# Patient Record
Sex: MALE | Race: WHITE | NOT HISPANIC OR LATINO | URBAN - METROPOLITAN AREA
[De-identification: names, ages, dates, MRNs, and addresses within clinical notes are randomized per-mention and may not be internally consistent; named-entity substitution may affect disease eponyms.]

---

## 2023-03-22 LAB — PROSTATE SPECIFIC ANTIGEN,SCREEN: 2.92 NG/ML (ref 0–4)

## 2023-10-08 PROBLEM — R00.2 PALPITATIONS: Status: ACTIVE | Noted: 2023-10-08

## 2023-10-08 PROBLEM — E85.4 AMYLOID HEART DISEASE (MULTI): Status: ACTIVE | Noted: 2023-10-08

## 2023-10-08 PROBLEM — R07.9 CHEST PAIN ON EXERTION: Status: ACTIVE | Noted: 2023-10-08

## 2023-10-08 PROBLEM — R31.9 HEMATURIA: Status: ACTIVE | Noted: 2023-10-08

## 2023-10-08 PROBLEM — N39.43 BENIGN PROSTATIC HYPERPLASIA (BPH) WITH POST-VOID DRIBBLING: Status: ACTIVE | Noted: 2023-10-08

## 2023-10-08 PROBLEM — N40.1 BENIGN PROSTATIC HYPERPLASIA (BPH) WITH POST-VOID DRIBBLING: Status: ACTIVE | Noted: 2023-10-08

## 2023-10-08 PROBLEM — I51.9 CHRONIC SYSTOLIC DYSFUNCTION OF LEFT VENTRICLE: Status: ACTIVE | Noted: 2023-10-08

## 2023-10-08 PROBLEM — R31.0 GROSS HEMATURIA: Status: ACTIVE | Noted: 2023-10-08

## 2023-10-08 PROBLEM — K40.90 INGUINAL HERNIA, LEFT: Status: ACTIVE | Noted: 2023-10-08

## 2023-10-08 PROBLEM — I27.20 PULMONARY HYPERTENSION (MULTI): Status: ACTIVE | Noted: 2023-10-08

## 2023-10-08 PROBLEM — I43 AMYLOID HEART DISEASE (MULTI): Status: ACTIVE | Noted: 2023-10-08

## 2023-10-08 PROBLEM — E78.5 HYPERLIPIDEMIA: Status: ACTIVE | Noted: 2023-10-08

## 2023-10-08 PROBLEM — C61 ADENOCARCINOMA OF PROSTATE (MULTI): Status: ACTIVE | Noted: 2023-10-08

## 2023-10-08 PROBLEM — I47.20 VENTRICULAR TACHYCARDIA (PAROXYSMAL): Status: ACTIVE | Noted: 2023-10-08

## 2023-10-08 PROBLEM — I47.29 VENTRICULAR TACHYCARDIA (PAROXYSMAL) (MULTI): Status: ACTIVE | Noted: 2023-10-08

## 2023-10-08 RX ORDER — NADOLOL 20 MG/1
1 TABLET ORAL DAILY
COMMUNITY
Start: 2017-02-08 | End: 2023-10-10 | Stop reason: WASHOUT

## 2023-10-08 RX ORDER — AMLODIPINE BESYLATE 2.5 MG/1
1 TABLET ORAL DAILY
COMMUNITY
Start: 2020-10-07 | End: 2023-10-10

## 2023-10-08 RX ORDER — MELOXICAM 15 MG/1
1 TABLET ORAL DAILY
COMMUNITY
Start: 2017-06-23

## 2023-10-08 RX ORDER — TAMSULOSIN HYDROCHLORIDE 0.4 MG/1
1 CAPSULE ORAL DAILY
COMMUNITY
Start: 2021-05-06

## 2023-10-10 ENCOUNTER — LAB (OUTPATIENT)
Dept: LAB | Facility: LAB | Age: 73
End: 2023-10-10
Payer: MEDICARE

## 2023-10-10 ENCOUNTER — OFFICE VISIT (OUTPATIENT)
Dept: GASTROENTEROLOGY | Facility: CLINIC | Age: 73
End: 2023-10-10
Payer: MEDICARE

## 2023-10-10 VITALS
SYSTOLIC BLOOD PRESSURE: 155 MMHG | BODY MASS INDEX: 32.64 KG/M2 | HEIGHT: 70 IN | HEART RATE: 75 BPM | DIASTOLIC BLOOD PRESSURE: 78 MMHG | WEIGHT: 228 LBS

## 2023-10-10 DIAGNOSIS — R19.4 CHANGE IN BOWEL HABITS: Primary | ICD-10-CM

## 2023-10-10 DIAGNOSIS — R19.4 CHANGE IN BOWEL HABITS: ICD-10-CM

## 2023-10-10 LAB — CRP SERPL-MCNC: 1.66 MG/DL

## 2023-10-10 PROCEDURE — 1160F RVW MEDS BY RX/DR IN RCRD: CPT | Performed by: PHYSICIAN ASSISTANT

## 2023-10-10 PROCEDURE — 1036F TOBACCO NON-USER: CPT | Performed by: PHYSICIAN ASSISTANT

## 2023-10-10 PROCEDURE — 86140 C-REACTIVE PROTEIN: CPT

## 2023-10-10 PROCEDURE — 1159F MED LIST DOCD IN RCRD: CPT | Performed by: PHYSICIAN ASSISTANT

## 2023-10-10 PROCEDURE — 99204 OFFICE O/P NEW MOD 45 MIN: CPT | Performed by: PHYSICIAN ASSISTANT

## 2023-10-10 PROCEDURE — 36415 COLL VENOUS BLD VENIPUNCTURE: CPT

## 2023-10-10 RX ORDER — AMLODIPINE BESYLATE 10 MG/1
10 TABLET ORAL DAILY
COMMUNITY
Start: 2023-09-30

## 2023-10-10 RX ORDER — LOSARTAN POTASSIUM 50 MG/1
50 TABLET ORAL
COMMUNITY
Start: 2023-03-29 | End: 2024-03-28

## 2023-10-10 NOTE — PATIENT INSTRUCTIONS
Thank you for coming in for your appointment.    -get labs and stool study done  -Get colonoscopy done  -Try daily fiber supplement.

## 2023-10-10 NOTE — H&P (VIEW-ONLY)
Subjective   Patient ID: Chicho Hyde is a 73 y.o. male who was referred by himself for New Patient Visit (Referred by  for changes in bowel habits/Denies: Abdominal pain, constipation and rectal bleeding./LS: 03/2019/Colon: 04/04/2019).    Patient's PCP is Dr. Hinojosa    HPI  Patient was seen by our office in 2019 for screening colonoscopy. He had colonoscopy in April 2019 in which 2 small polyps were removed, one benign and one tubular adenoma. He states that over the past few months he has had a change in bowel habits to looser stool. He is having 1 loose stool daily. He states that his bowel movements are more urgent than before. He denies medication changes or lifestyle changes. Denies unexplained weight loss, dysphagia, N/V, melena, hematochezia. He states that he has been having gross hematuria and saw urology. He reports a family history of colon caner in his grandfather around age 50. Denies NSAID use. 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 Following up with ortho soon.    Prior GI evaluation:  Colonoscopy 4/2019: 2 polyps removed (1 benign, 1 tubular adenoma).    Review of Systems:  Constitutional: No reported fever, chills, or weight loss.  Skin: No reported icterus, lesions, or rash.  Eye: No reported itching, pain, vision changes.  Ear: No reported discharge, hearing loss, or pain.  Nose: No reported congestion, discharge, or epistaxis.  Mouth/throat: No reported dysphagia, hoarseness, or throat pain.  Resp: No reported cough, dyspnea, or wheezing.  Cardiovascular: No reported chest pain, lower extremity edema, or palpitations.   GI: Reports change in bowel habits to 1 loose stool daily.  : No reported dysuria, hematuria, or frequency.  Neuro: No reported confusion, memory loss, headaches, or dizziness.  Psych: No reported anxiety, depression, or insomnia.  Musculoskeletal: No reported arthralgia, joint swelling, or myalgias.  Heme/lymph: No reported easy bleeding or  bruising, or swollen lymph nodes.  Endocrine: No reported cold/heat intolerance, polydipsia, or polyuria.      Medications:  Prior to Admission medications    Medication Sig Start Date End Date Taking? Authorizing Provider   amLODIPine (Norvasc) 10 mg tablet Take 1 tablet (10 mg) by mouth once daily. 9/30/23  Yes Historical Provider, MD   losartan (Cozaar) 50 mg tablet Take 1 tablet (50 mg) by mouth once daily. 3/29/23 3/28/24 Yes Historical Provider, MD   meloxicam (Mobic) 15 mg tablet Take 1 tablet (15 mg) by mouth once daily. WITH FOOD 6/23/17  Yes Historical Provider, MD   tafamidis (Vyndamax) 61 mg capsule Take by mouth.   Yes Historical Provider, MD   tamsulosin (Flomax) 0.4 mg 24 hr capsule Take 1 capsule (0.4 mg) by mouth once daily. 5/6/21  Yes Historical Provider, MD   amLODIPine (Norvasc) 2.5 mg tablet Take 1 tablet (2.5 mg) by mouth once daily. 10/7/20 10/10/23  Historical Provider, MD   nadolol (Corgard) 20 mg tablet Take 1 tablet (20 mg) by mouth once daily. 2/8/17 10/10/23  Historical Provider, MD       Allergies:  Oxaprozin    Past Medical History:  He has a past medical history of Functional dyspepsia, Other forms of dyspnea, Personal history of other diseases of male genital organs, Personal history of other diseases of the musculoskeletal system and connective tissue, Personal history of other diseases of the nervous system and sense organs, Personal history of other diseases of the nervous system and sense organs, Personal history of other endocrine, nutritional and metabolic disease, Personal history of other specified conditions, Personal history of other specified conditions, and Personal history of other specified conditions.    Past Surgical History:  He has a past surgical history that includes Carpal tunnel release (06/30/2017); Other surgical history (08/02/2017); Umbilical hernia repair (08/02/2017); Hernia repair (08/02/2017); Other surgical history (08/14/2019); and  "Colonoscopy.    Social History:  He reports that he has never smoked. He has never used smokeless tobacco. He reports current alcohol use of about 2.0 standard drinks of alcohol per week. He reports that he does not use drugs.    Objective   Physical exam:  Constitutional: Well developed, well nourished 73 y.o. year old in no acute distress.   Skin: Warm and dry. Normal turgor. No rash, ulcer, trauma, jaundice.   Eyes: Pupils symmetric and reactive to light.  Respiratory: Clear to auscultation bilaterally. No wheezes, rhonchi, or rales heard.  Cardiovascular: Regular rate and rhythm. S1 and S2 appreciated and normal. No murmur, rub, or gallop heard.   Edema: No edema noted.  GI: Normal bowel sounds. Soft, non-distended, non-tender. No rebound or guarding present. No hepatomegaly or splenomegaly appreciated. Abdominal aorta not palpably abnormal.  Musculoskeletal: Limbs and Joints grossly normal. Full range of motion in major joint.   Neuro: Alert and oriented x 3. Cranial nerves 2-12 grossly intact.   Psych: Normal mood and affect.        Relevant Results Recent labs reviewed in the EMR.  No results found for: \"HGB\", \"MCV\", \"PLT\"    No results found for: \"FERRITIN\", \"IRON\"    No results found for: \"NA\", \"K\", \"CL\", \"BUN\", \"CREATININE\"    No results found for: \"BILITOT\"  No results found for: \"ALT\", \"AST\", \"ALKPHOS\"    No results found for: \"CRP\"    No results found for: \"CALPS\"    Radiology: Reviewed imaging reviewed in the EMR.  CT urography w 3D volume rendered imaging    Result Date: 9/15/2023  Interpreted By:  JOSE DURANT MD MRN: [id number] Patient Name: DODIE CLAYTON  STUDY: CT UROGRAPHY WITH AND WITHOUT CONTRAST;  9/15/2023 11:34 am  INDICATION: gross hematuria  R31.0: Gross hematuria.  COMPARISON: CT abdomen and pelvis without contrast 11 May 2018  ACCESSION NUMBER(S): [id number]  ORDERING CLINICIAN: JUAN CORTEZ  TECHNIQUE: CT Abdomen and pelvis from the lung bases through the symphysis pubis using CT Urogram " protocol, including CT of the abdomen and pelvis without IV contrast, followed by 2-phase CT of both the abdomen and pelvis after the uneventful administration of 75 mL Omnipaque 350 intravenous contrast, including nephrographic and excretory phases of the abdomen and pelvis.  No oral contrast.  Sagittal and coronal reformatted images of both the nephrographic and excretory phases were created and reviewed.  Three dimensional maximum intensity projection (3-D MIPs) image/s were created on a separate dedicated workstation, reviewed and saved  FINDINGS: KIDNEYS AND URINARY TRACT:  RIGHT: Stone:  Negative Hydronephrosis:  Negative Ureteral stricture:  Negative Parenchymal mass:  Negative. No solid, enhancing or otherwise suspect parenchymal mass lesion Urothelial mass:  Negative. No soft tissue attenuation filling defects in the excreted contrast-opacified collecting system or ureter. Limitations:  None; the entire ureter was well opacified with excreted contrast on the excretory phase Congenital variant anatomy:  Negative; no variant anatomy such as duplicated ureters Other:  n/a  LEFT: Stone:  Negative Hydronephrosis:  Negative Ureteral stricture:  Negative Parenchymal mass:  Negative. No solid, enhancing or otherwise suspect parenchymal mass lesion Urothelial mass:  Negative. No soft tissue attenuation filling defects in the excreted contrast-opacified collecting system or ureter. Limitations:  None; the entire ureter was well opacified with excreted contrast on the excretory phase Congenital variant anatomy:  Negative; no variant anatomy such as duplicated ureters Other:  n/a  URINARY BLADDER: Stone:  Negative Urothelial mass:  Negative. No soft tissue attenuation filling defects in the excreted contrast-opacified urinary bladder Limitations:  The nondependent one half of the urinary bladder was not well enough opacified with excreted contrast to evaluate for an underlying lesion Wall thickening:  Negative  Surrounding acute inflammatory change: Negative Diverticula:  Negative Trabeculations:  Negative Congenital variant anatomy:  Negative. No variant anatomy such as urachal remnant Fistula:  Negative Other:  n/a  ABDOMEN:  LIVER:  Normal except for two subcentimeter simple cysts both unchanged, both of highly doubtful clinical consequence. No enlargement or evidence of cirrhosis or fatty change. No mass or other suspect lesion.  SPLEEN: Normal. No enlargement, mass or evidence of splenic vein thrombosis.  PANCREAS: Normal. No CT evidence of acute or chronic pancreatitis. No duct dilation. No mass.  GALLBLADDER:  Normal CT appearance. No dilation, calcified, or gas-containing stones.  Other types of gallstones could be occult on CT and detectable only by ultrasound.  BILE DUCTS:  Normal. No biliary duct dilation.  ADRENAL GLANDS: Normal. No nodule or mass.  LYMPH NODES:  No adenopathy, intraperitoneal, retroperitoneal, pelvic or otherwise  APPENDIX:  Normal.  Not dilated, thick walled or in any other way inflamed in appearance.  No inflammatory change about the appendix.  COLON:  Normal. No sign of acute diverticulitis or other colitis. No annular constricting mass.  SMALL BOWEL: Normal. No small bowel dilation or any other sign of small bowel obstruction. No sign of active inflammatory bowel disease.  STOMACH / DUODENUM: Grossly normal by CT which has limited sensitivity and specificity for the stomach and duodenum.  RETROPERITONEUM:  Normal. No acute hemorrhage or inflammatory change. Lymph nodes in a separate dedicated section.  OMENTUM, MESENTERY AND PERITONEAL SPACES: Free intraperitoneal air:  Negative Free intraperitoneal fluid:  Negative Abscess:  Negative Other:  n/a  PELVIS:  Prostate partially obscured by streak artifact from the right hip prosthesis  VASCULATURE:  No choose one active hernia. I suspect prior left inguinal hernia repair  ABDOMINAL WALL: Hernia:  No active hernia. Hernia repair mesh ventral  pelvic wall is intact and unchanged in CT appearance Other:  No acute or contributory abnormality.  LOWER CHEST:  No acute airspace disease  BONES:  No acute findings      NO ACUTE OR CONTRIBUTORY ABNORMALITY  NO STONE ANYWHERE IN THE URINARY TRACT  NO HYDROURETERONEPHROSIS ON EITHER SIDE  NO EVIDENCE OF ACUTE INFLAMMATION ANYWHERE IN THE URINARY TRACT  NO SOLID RENAL PARENCHYMAL MASS  NO EVIDENCE OF UROTHELIAL LESION IN THE OPACIFIED URINARY TRACT, NOTING THAT THE FOLLOWING WAS / WERE NOT WELL ENOUGH OPACIFIED WITH EXCRETED CONTRAST DURING THE EXCRETORY PHASE OF TODAY'S EXAM TO EVALUATE DIRECTLY: THE NONDEPENDENT ONE HALF OF THE URINARY BLADDER. (BILATERAL UPPER TRACTS WELL OPACIFIED THROUGHOUT, CLEAR THROUGHOUT)  NO VARIANT ANATOMY SUCH AS URACHAL REMNANT OR DUPLICATED/ECTOPIC URETERS  NO ACUTE UNANTICIPATED PROCESS IN THE ABDOMEN OR PELVIS OUTSIDE THE URINARY TRACT      Assessment/Plan   Problem List Items Addressed This Visit             ICD-10-CM       Gastrointestinal and Abdominal    Change in bowel habits - Primary R19.4     Started about June 2023- change to loose stool. He states that it is occurring 3-4 days a week. He states that he has 1 loose stool a day. Used to be formed. Denies any medication changes. Will order colonoscopy to evaluate for inflammation or polyp/removed. CRP and calprotectin ordered. Recommend daily fiber         Relevant Orders    C-Reactive Protein    Calprotectin, Fecal    Colonoscopy Diagnostic         Sheron Mares PA-C

## 2023-10-10 NOTE — ASSESSMENT & PLAN NOTE
Started about June 2023- change to loose stool. He states that it is occurring 3-4 days a week. He states that he has 1 loose stool a day. Used to be formed. Denies any medication changes. Will order colonoscopy to evaluate for inflammation or polyp/removed. CRP and calprotectin ordered. Recommend daily fiber

## 2023-10-10 NOTE — PROGRESS NOTES
Subjective   Patient ID: Chicho Hyde is a 73 y.o. male who was referred by himself for New Patient Visit (Referred by  for changes in bowel habits/Denies: Abdominal pain, constipation and rectal bleeding./LS: 03/2019/Colon: 04/04/2019).    Patient's PCP is Dr. Hinojosa    HPI  Patient was seen by our office in 2019 for screening colonoscopy. He had colonoscopy in April 2019 in which 2 small polyps were removed, one benign and one tubular adenoma. He states that over the past few months he has had a change in bowel habits to looser stool. He is having 1 loose stool daily. He states that his bowel movements are more urgent than before. He denies medication changes or lifestyle changes. Denies unexplained weight loss, dysphagia, N/V, melena, hematochezia. He states that he has been having gross hematuria and saw urology. He reports a family history of colon caner in his grandfather around age 50. Denies NSAID use. 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 Following up with ortho soon.    Prior GI evaluation:  Colonoscopy 4/2019: 2 polyps removed (1 benign, 1 tubular adenoma).    Review of Systems:  Constitutional: No reported fever, chills, or weight loss.  Skin: No reported icterus, lesions, or rash.  Eye: No reported itching, pain, vision changes.  Ear: No reported discharge, hearing loss, or pain.  Nose: No reported congestion, discharge, or epistaxis.  Mouth/throat: No reported dysphagia, hoarseness, or throat pain.  Resp: No reported cough, dyspnea, or wheezing.  Cardiovascular: No reported chest pain, lower extremity edema, or palpitations.   GI: Reports change in bowel habits to 1 loose stool daily.  : No reported dysuria, hematuria, or frequency.  Neuro: No reported confusion, memory loss, headaches, or dizziness.  Psych: No reported anxiety, depression, or insomnia.  Musculoskeletal: No reported arthralgia, joint swelling, or myalgias.  Heme/lymph: No reported easy bleeding or  bruising, or swollen lymph nodes.  Endocrine: No reported cold/heat intolerance, polydipsia, or polyuria.      Medications:  Prior to Admission medications    Medication Sig Start Date End Date Taking? Authorizing Provider   amLODIPine (Norvasc) 10 mg tablet Take 1 tablet (10 mg) by mouth once daily. 9/30/23  Yes Historical Provider, MD   losartan (Cozaar) 50 mg tablet Take 1 tablet (50 mg) by mouth once daily. 3/29/23 3/28/24 Yes Historical Provider, MD   meloxicam (Mobic) 15 mg tablet Take 1 tablet (15 mg) by mouth once daily. WITH FOOD 6/23/17  Yes Historical Provider, MD   tafamidis (Vyndamax) 61 mg capsule Take by mouth.   Yes Historical Provider, MD   tamsulosin (Flomax) 0.4 mg 24 hr capsule Take 1 capsule (0.4 mg) by mouth once daily. 5/6/21  Yes Historical Provider, MD   amLODIPine (Norvasc) 2.5 mg tablet Take 1 tablet (2.5 mg) by mouth once daily. 10/7/20 10/10/23  Historical Provider, MD   nadolol (Corgard) 20 mg tablet Take 1 tablet (20 mg) by mouth once daily. 2/8/17 10/10/23  Historical Provider, MD       Allergies:  Oxaprozin    Past Medical History:  He has a past medical history of Functional dyspepsia, Other forms of dyspnea, Personal history of other diseases of male genital organs, Personal history of other diseases of the musculoskeletal system and connective tissue, Personal history of other diseases of the nervous system and sense organs, Personal history of other diseases of the nervous system and sense organs, Personal history of other endocrine, nutritional and metabolic disease, Personal history of other specified conditions, Personal history of other specified conditions, and Personal history of other specified conditions.    Past Surgical History:  He has a past surgical history that includes Carpal tunnel release (06/30/2017); Other surgical history (08/02/2017); Umbilical hernia repair (08/02/2017); Hernia repair (08/02/2017); Other surgical history (08/14/2019); and  "Colonoscopy.    Social History:  He reports that he has never smoked. He has never used smokeless tobacco. He reports current alcohol use of about 2.0 standard drinks of alcohol per week. He reports that he does not use drugs.    Objective   Physical exam:  Constitutional: Well developed, well nourished 73 y.o. year old in no acute distress.   Skin: Warm and dry. Normal turgor. No rash, ulcer, trauma, jaundice.   Eyes: Pupils symmetric and reactive to light.  Respiratory: Clear to auscultation bilaterally. No wheezes, rhonchi, or rales heard.  Cardiovascular: Regular rate and rhythm. S1 and S2 appreciated and normal. No murmur, rub, or gallop heard.   Edema: No edema noted.  GI: Normal bowel sounds. Soft, non-distended, non-tender. No rebound or guarding present. No hepatomegaly or splenomegaly appreciated. Abdominal aorta not palpably abnormal.  Musculoskeletal: Limbs and Joints grossly normal. Full range of motion in major joint.   Neuro: Alert and oriented x 3. Cranial nerves 2-12 grossly intact.   Psych: Normal mood and affect.        Relevant Results Recent labs reviewed in the EMR.  No results found for: \"HGB\", \"MCV\", \"PLT\"    No results found for: \"FERRITIN\", \"IRON\"    No results found for: \"NA\", \"K\", \"CL\", \"BUN\", \"CREATININE\"    No results found for: \"BILITOT\"  No results found for: \"ALT\", \"AST\", \"ALKPHOS\"    No results found for: \"CRP\"    No results found for: \"CALPS\"    Radiology: Reviewed imaging reviewed in the EMR.  CT urography w 3D volume rendered imaging    Result Date: 9/15/2023  Interpreted By:  JOSE DURANT MD MRN: [id number] Patient Name: DODIE CLAYTON  STUDY: CT UROGRAPHY WITH AND WITHOUT CONTRAST;  9/15/2023 11:34 am  INDICATION: gross hematuria  R31.0: Gross hematuria.  COMPARISON: CT abdomen and pelvis without contrast 11 May 2018  ACCESSION NUMBER(S): [id number]  ORDERING CLINICIAN: JUAN CORTEZ  TECHNIQUE: CT Abdomen and pelvis from the lung bases through the symphysis pubis using CT Urogram " protocol, including CT of the abdomen and pelvis without IV contrast, followed by 2-phase CT of both the abdomen and pelvis after the uneventful administration of 75 mL Omnipaque 350 intravenous contrast, including nephrographic and excretory phases of the abdomen and pelvis.  No oral contrast.  Sagittal and coronal reformatted images of both the nephrographic and excretory phases were created and reviewed.  Three dimensional maximum intensity projection (3-D MIPs) image/s were created on a separate dedicated workstation, reviewed and saved  FINDINGS: KIDNEYS AND URINARY TRACT:  RIGHT: Stone:  Negative Hydronephrosis:  Negative Ureteral stricture:  Negative Parenchymal mass:  Negative. No solid, enhancing or otherwise suspect parenchymal mass lesion Urothelial mass:  Negative. No soft tissue attenuation filling defects in the excreted contrast-opacified collecting system or ureter. Limitations:  None; the entire ureter was well opacified with excreted contrast on the excretory phase Congenital variant anatomy:  Negative; no variant anatomy such as duplicated ureters Other:  n/a  LEFT: Stone:  Negative Hydronephrosis:  Negative Ureteral stricture:  Negative Parenchymal mass:  Negative. No solid, enhancing or otherwise suspect parenchymal mass lesion Urothelial mass:  Negative. No soft tissue attenuation filling defects in the excreted contrast-opacified collecting system or ureter. Limitations:  None; the entire ureter was well opacified with excreted contrast on the excretory phase Congenital variant anatomy:  Negative; no variant anatomy such as duplicated ureters Other:  n/a  URINARY BLADDER: Stone:  Negative Urothelial mass:  Negative. No soft tissue attenuation filling defects in the excreted contrast-opacified urinary bladder Limitations:  The nondependent one half of the urinary bladder was not well enough opacified with excreted contrast to evaluate for an underlying lesion Wall thickening:  Negative  Surrounding acute inflammatory change: Negative Diverticula:  Negative Trabeculations:  Negative Congenital variant anatomy:  Negative. No variant anatomy such as urachal remnant Fistula:  Negative Other:  n/a  ABDOMEN:  LIVER:  Normal except for two subcentimeter simple cysts both unchanged, both of highly doubtful clinical consequence. No enlargement or evidence of cirrhosis or fatty change. No mass or other suspect lesion.  SPLEEN: Normal. No enlargement, mass or evidence of splenic vein thrombosis.  PANCREAS: Normal. No CT evidence of acute or chronic pancreatitis. No duct dilation. No mass.  GALLBLADDER:  Normal CT appearance. No dilation, calcified, or gas-containing stones.  Other types of gallstones could be occult on CT and detectable only by ultrasound.  BILE DUCTS:  Normal. No biliary duct dilation.  ADRENAL GLANDS: Normal. No nodule or mass.  LYMPH NODES:  No adenopathy, intraperitoneal, retroperitoneal, pelvic or otherwise  APPENDIX:  Normal.  Not dilated, thick walled or in any other way inflamed in appearance.  No inflammatory change about the appendix.  COLON:  Normal. No sign of acute diverticulitis or other colitis. No annular constricting mass.  SMALL BOWEL: Normal. No small bowel dilation or any other sign of small bowel obstruction. No sign of active inflammatory bowel disease.  STOMACH / DUODENUM: Grossly normal by CT which has limited sensitivity and specificity for the stomach and duodenum.  RETROPERITONEUM:  Normal. No acute hemorrhage or inflammatory change. Lymph nodes in a separate dedicated section.  OMENTUM, MESENTERY AND PERITONEAL SPACES: Free intraperitoneal air:  Negative Free intraperitoneal fluid:  Negative Abscess:  Negative Other:  n/a  PELVIS:  Prostate partially obscured by streak artifact from the right hip prosthesis  VASCULATURE:  No choose one active hernia. I suspect prior left inguinal hernia repair  ABDOMINAL WALL: Hernia:  No active hernia. Hernia repair mesh ventral  pelvic wall is intact and unchanged in CT appearance Other:  No acute or contributory abnormality.  LOWER CHEST:  No acute airspace disease  BONES:  No acute findings      NO ACUTE OR CONTRIBUTORY ABNORMALITY  NO STONE ANYWHERE IN THE URINARY TRACT  NO HYDROURETERONEPHROSIS ON EITHER SIDE  NO EVIDENCE OF ACUTE INFLAMMATION ANYWHERE IN THE URINARY TRACT  NO SOLID RENAL PARENCHYMAL MASS  NO EVIDENCE OF UROTHELIAL LESION IN THE OPACIFIED URINARY TRACT, NOTING THAT THE FOLLOWING WAS / WERE NOT WELL ENOUGH OPACIFIED WITH EXCRETED CONTRAST DURING THE EXCRETORY PHASE OF TODAY'S EXAM TO EVALUATE DIRECTLY: THE NONDEPENDENT ONE HALF OF THE URINARY BLADDER. (BILATERAL UPPER TRACTS WELL OPACIFIED THROUGHOUT, CLEAR THROUGHOUT)  NO VARIANT ANATOMY SUCH AS URACHAL REMNANT OR DUPLICATED/ECTOPIC URETERS  NO ACUTE UNANTICIPATED PROCESS IN THE ABDOMEN OR PELVIS OUTSIDE THE URINARY TRACT      Assessment/Plan   Problem List Items Addressed This Visit             ICD-10-CM       Gastrointestinal and Abdominal    Change in bowel habits - Primary R19.4     Started about June 2023- change to loose stool. He states that it is occurring 3-4 days a week. He states that he has 1 loose stool a day. Used to be formed. Denies any medication changes. Will order colonoscopy to evaluate for inflammation or polyp/removed. CRP and calprotectin ordered. Recommend daily fiber         Relevant Orders    C-Reactive Protein    Calprotectin, Fecal    Colonoscopy Diagnostic         Sheron Mares PA-C

## 2023-10-13 ENCOUNTER — TELEPHONE (OUTPATIENT)
Dept: GASTROENTEROLOGY | Facility: CLINIC | Age: 73
End: 2023-10-13

## 2023-10-13 ENCOUNTER — LAB (OUTPATIENT)
Dept: LAB | Facility: LAB | Age: 73
End: 2023-10-13
Payer: MEDICARE

## 2023-10-13 DIAGNOSIS — R19.4 CHANGE IN BOWEL HABITS: ICD-10-CM

## 2023-10-13 PROCEDURE — 83993 ASSAY FOR CALPROTECTIN FECAL: CPT

## 2023-10-17 LAB — CALPROTECTIN STL-MCNT: 19 UG/G

## 2023-10-18 ENCOUNTER — ANESTHESIA (OUTPATIENT)
Dept: GASTROENTEROLOGY | Facility: HOSPITAL | Age: 73
End: 2023-10-18
Payer: MEDICARE

## 2023-10-18 ENCOUNTER — HOSPITAL ENCOUNTER (OUTPATIENT)
Dept: GASTROENTEROLOGY | Facility: HOSPITAL | Age: 73
Discharge: HOME | End: 2023-10-18
Payer: MEDICARE

## 2023-10-18 ENCOUNTER — ANESTHESIA EVENT (OUTPATIENT)
Dept: GASTROENTEROLOGY | Facility: HOSPITAL | Age: 73
End: 2023-10-18
Payer: MEDICARE

## 2023-10-18 VITALS
DIASTOLIC BLOOD PRESSURE: 78 MMHG | TEMPERATURE: 97.1 F | OXYGEN SATURATION: 95 % | HEART RATE: 74 BPM | WEIGHT: 225 LBS | BODY MASS INDEX: 32.21 KG/M2 | RESPIRATION RATE: 16 BRPM | SYSTOLIC BLOOD PRESSURE: 130 MMHG | HEIGHT: 70 IN

## 2023-10-18 DIAGNOSIS — R19.4 CHANGE IN BOWEL HABITS: ICD-10-CM

## 2023-10-18 PROBLEM — H91.93 BILATERAL HEARING LOSS: Status: ACTIVE | Noted: 2023-10-18

## 2023-10-18 PROCEDURE — 88305 TISSUE EXAM BY PATHOLOGIST: CPT | Performed by: PATHOLOGY

## 2023-10-18 PROCEDURE — 88305 TISSUE EXAM BY PATHOLOGIST: CPT | Mod: TC,SUR,PORLAB | Performed by: INTERNAL MEDICINE

## 2023-10-18 PROCEDURE — 2500000004 HC RX 250 GENERAL PHARMACY W/ HCPCS (ALT 636 FOR OP/ED): Performed by: INTERNAL MEDICINE

## 2023-10-18 PROCEDURE — 3700000002 HC GENERAL ANESTHESIA TIME - EACH INCREMENTAL 1 MINUTE

## 2023-10-18 PROCEDURE — 7100000010 HC PHASE TWO TIME - EACH INCREMENTAL 1 MINUTE

## 2023-10-18 PROCEDURE — 2500000005 HC RX 250 GENERAL PHARMACY W/O HCPCS: Performed by: NURSE ANESTHETIST, CERTIFIED REGISTERED

## 2023-10-18 PROCEDURE — 3700000001 HC GENERAL ANESTHESIA TIME - INITIAL BASE CHARGE

## 2023-10-18 PROCEDURE — 45380 COLONOSCOPY AND BIOPSY: CPT | Performed by: INTERNAL MEDICINE

## 2023-10-18 PROCEDURE — 7100000009 HC PHASE TWO TIME - INITIAL BASE CHARGE

## 2023-10-18 PROCEDURE — 2500000004 HC RX 250 GENERAL PHARMACY W/ HCPCS (ALT 636 FOR OP/ED): Performed by: NURSE ANESTHETIST, CERTIFIED REGISTERED

## 2023-10-18 PROCEDURE — 88305 TISSUE EXAM BY PATHOLOGIST: CPT | Mod: TC,PORLAB | Performed by: INTERNAL MEDICINE

## 2023-10-18 PROCEDURE — A4649 SURGICAL SUPPLIES: HCPCS

## 2023-10-18 RX ORDER — SODIUM CHLORIDE 9 MG/ML
30 INJECTION, SOLUTION INTRAVENOUS CONTINUOUS
Status: DISCONTINUED | OUTPATIENT
Start: 2023-10-18 | End: 2023-10-20 | Stop reason: HOSPADM

## 2023-10-18 RX ORDER — LIDOCAINE HYDROCHLORIDE 20 MG/ML
INJECTION, SOLUTION EPIDURAL; INFILTRATION; INTRACAUDAL; PERINEURAL AS NEEDED
Status: DISCONTINUED | OUTPATIENT
Start: 2023-10-18 | End: 2023-10-18

## 2023-10-18 RX ORDER — PROPOFOL 10 MG/ML
INJECTION, EMULSION INTRAVENOUS AS NEEDED
Status: DISCONTINUED | OUTPATIENT
Start: 2023-10-18 | End: 2023-10-18

## 2023-10-18 RX ADMIN — PROPOFOL 50 MG: 10 INJECTION, EMULSION INTRAVENOUS at 13:25

## 2023-10-18 RX ADMIN — PROPOFOL 20 MG: 10 INJECTION, EMULSION INTRAVENOUS at 13:34

## 2023-10-18 RX ADMIN — LIDOCAINE HYDROCHLORIDE 50 MG: 20 INJECTION, SOLUTION EPIDURAL; INFILTRATION; INTRACAUDAL; PERINEURAL at 13:25

## 2023-10-18 RX ADMIN — SODIUM CHLORIDE 30 ML/HR: 9 INJECTION, SOLUTION INTRAVENOUS at 11:15

## 2023-10-18 RX ADMIN — SODIUM CHLORIDE: 9 INJECTION, SOLUTION INTRAVENOUS at 13:15

## 2023-10-18 RX ADMIN — PROPOFOL 20 MG: 10 INJECTION, EMULSION INTRAVENOUS at 13:32

## 2023-10-18 RX ADMIN — PROPOFOL 50 MG: 10 INJECTION, EMULSION INTRAVENOUS at 13:27

## 2023-10-18 RX ADMIN — PROPOFOL 20 MG: 10 INJECTION, EMULSION INTRAVENOUS at 13:36

## 2023-10-18 RX ADMIN — PROPOFOL 40 MG: 10 INJECTION, EMULSION INTRAVENOUS at 13:29

## 2023-10-18 SDOH — HEALTH STABILITY: MENTAL HEALTH: CURRENT SMOKER: 0

## 2023-10-18 ASSESSMENT — PAIN SCALES - GENERAL
PAINLEVEL_OUTOF10: 0 - NO PAIN

## 2023-10-18 ASSESSMENT — PAIN - FUNCTIONAL ASSESSMENT
PAIN_FUNCTIONAL_ASSESSMENT: 0-10

## 2023-10-18 ASSESSMENT — COLUMBIA-SUICIDE SEVERITY RATING SCALE - C-SSRS
2. HAVE YOU ACTUALLY HAD ANY THOUGHTS OF KILLING YOURSELF?: NO
6. HAVE YOU EVER DONE ANYTHING, STARTED TO DO ANYTHING, OR PREPARED TO DO ANYTHING TO END YOUR LIFE?: NO
1. IN THE PAST MONTH, HAVE YOU WISHED YOU WERE DEAD OR WISHED YOU COULD GO TO SLEEP AND NOT WAKE UP?: NO

## 2023-10-18 NOTE — ANESTHESIA PREPROCEDURE EVALUATION
Patient: Chicho Hyde    Procedure Information       Date/Time: 10/18/23 1115    Scheduled providers: Cristhian Whitley MD    Procedure: COLONOSCOPY    Location: [facility]            Relevant Problems   Anesthesia (within normal limits)      Cardiovascular  Sees cardiologist [facility], last in 2/2023   (+) Chest pain on exertion   (+) Hyperlipidemia   (+) Pulmonary hypertension (CMS/HCC)   (+) Ventricular tachycardia (paroxysmal) (CMS/HCC)      Endocrine (within normal limits)      GI (within normal limits)      /Renal (within normal limits)      Pulmonary   (+) Pulmonary hypertension (CMS/HCC)      GI/Hepatic (within normal limits)      Hematology (within normal limits)      Eyes, Ears, Nose, and Throat   (+) Bilateral hearing loss       Clinical information reviewed:   Tobacco  Allergies  Meds   Med Hx  Surg Hx   Fam Hx  Soc Hx        NPO Detail:  NPO/Void Status  Carbonhydrate Drink Given Prior to Surgery? : N  Date of Last Liquid: 10/17/23  Time of Last Liquid: 1830  Date of Last Solid: 10/16/23  Time of Last Solid: 2000         Physical Exam    Airway  Mallampati: II  TM distance: >3 FB  Neck ROM: full     Cardiovascular   Rhythm: regular  Rate: normal     Dental - normal exam     Pulmonary - normal exam     Abdominal - normal exam             Anesthesia Plan    ASA 3     MAC     The patient is not a current smoker.  Patient was previously instructed to abstain from smoking on day of procedure.  Patient did not smoke on day of procedure.    intravenous induction   Anesthetic plan and risks discussed with patient.  Use of blood products discussed with who consented to blood products.

## 2023-10-18 NOTE — INTERVAL H&P NOTE
H&P reviewed. The patient was examined and there are no changes to the H&P.  The patient is here for evaluation of recent change in bowel habits characterized by increasingly loose stools.  There is no report of blood in stool.  Colonoscopy done in 2019 revealed 1 tubular adenoma and 1 hyperplastic polyp.  His abdominal examination is unremarkable.  Colonoscopy is planned for today.

## 2023-10-18 NOTE — ANESTHESIA POSTPROCEDURE EVALUATION
Patient: Chicho Hyde    Procedure Summary       Date: 10/18/23 Room / Location: [facility]    Anesthesia Start: 1323 Anesthesia Stop: 1350    Procedure: COLONOSCOPY Diagnosis: Change in bowel habits    Scheduled Providers: Cristhian Whitley MD Responsible Provider: DENVER Good    Anesthesia Type: MAC ASA Status: 3            Anesthesia Type: MAC    Vitals Value Taken Time   /80 10/18/23 1347   Temp 36.7 °C (98 °F) 10/18/23 1347   Pulse 73 10/18/23 1347   Resp 16 10/18/23 1347   SpO2 93 % 10/18/23 1347       Anesthesia Post Evaluation    Patient location during evaluation: bedside  Patient participation: complete - patient participated  Level of consciousness: awake  Pain management: adequate  Airway patency: patent  Cardiovascular status: acceptable  Respiratory status: acceptable  Hydration status: acceptable    There were no known notable events for this encounter.

## 2023-10-24 LAB
LABORATORY COMMENT REPORT: NORMAL
PATH REPORT.FINAL DX SPEC: NORMAL
PATH REPORT.GROSS SPEC: NORMAL
PATH REPORT.RELEVANT HX SPEC: NORMAL
PATH REPORT.TOTAL CANCER: NORMAL

## 2024-03-25 ENCOUNTER — LAB (OUTPATIENT)
Dept: LAB | Facility: LAB | Age: 74
End: 2024-03-25
Payer: MEDICARE

## 2024-03-25 DIAGNOSIS — Z12.5 ENCOUNTER FOR SCREENING FOR MALIGNANT NEOPLASM OF PROSTATE: Primary | ICD-10-CM

## 2024-03-25 DIAGNOSIS — Z12.5 ENCOUNTER FOR SCREENING FOR MALIGNANT NEOPLASM OF PROSTATE: ICD-10-CM

## 2024-03-25 DIAGNOSIS — C61 MALIGNANT NEOPLASM OF PROSTATE (MULTI): ICD-10-CM

## 2024-03-25 LAB — PSA SERPL-MCNC: 3.51 NG/ML

## 2024-03-25 PROCEDURE — G0103 PSA SCREENING: HCPCS

## 2024-03-25 PROCEDURE — 36415 COLL VENOUS BLD VENIPUNCTURE: CPT

## 2025-06-16 ENCOUNTER — HOSPITAL ENCOUNTER (OUTPATIENT)
Dept: VASCULAR MEDICINE | Facility: CLINIC | Age: 75
Discharge: HOME | End: 2025-06-16
Payer: MEDICARE

## 2025-06-16 DIAGNOSIS — I43 CARDIOMYOPATHY IN DISEASES CLASSIFIED ELSEWHERE (MULTI): ICD-10-CM

## 2025-06-16 DIAGNOSIS — I73.9 PERIPHERAL VASCULAR DISEASE, UNSPECIFIED: ICD-10-CM

## 2025-06-16 DIAGNOSIS — L98.491 NON-PRESSURE CHRONIC ULCER OF SKIN OF OTHER SITES LIMITED TO BREAKDOWN OF SKIN: ICD-10-CM

## 2025-06-16 PROCEDURE — 93923 UPR/LXTR ART STDY 3+ LVLS: CPT

## 2025-06-16 PROCEDURE — 93923 UPR/LXTR ART STDY 3+ LVLS: CPT | Performed by: SURGERY
